# Patient Record
Sex: MALE | Race: WHITE | Employment: FULL TIME | URBAN - METROPOLITAN AREA
[De-identification: names, ages, dates, MRNs, and addresses within clinical notes are randomized per-mention and may not be internally consistent; named-entity substitution may affect disease eponyms.]

---

## 2018-12-28 ENCOUNTER — HOSPITAL ENCOUNTER (OUTPATIENT)
Dept: PREADMISSION TESTING | Age: 43
Discharge: HOME | End: 2018-12-28

## 2018-12-28 VITALS
WEIGHT: 244 LBS | BODY MASS INDEX: 34.93 KG/M2 | HEIGHT: 70 IN | TEMPERATURE: 97.9 F | HEART RATE: 93 BPM | SYSTOLIC BLOOD PRESSURE: 152 MMHG | RESPIRATION RATE: 16 BRPM | DIASTOLIC BLOOD PRESSURE: 93 MMHG

## 2018-12-28 RX ORDER — GLUCOSAMINE SULFATE 1500 MG
1000 POWDER IN PACKET (EA) ORAL DAILY
COMMUNITY

## 2018-12-28 RX ORDER — NAPROXEN 500 MG/1
500 TABLET ORAL AS NEEDED
COMMUNITY
End: 2019-01-09

## 2018-12-28 RX ORDER — ESOMEPRAZOLE MAGNESIUM 40 MG/1
40 CAPSULE, DELAYED RELEASE ORAL DAILY
COMMUNITY

## 2019-01-04 NOTE — H&P
Fausto Guzman Location: [place] [address] [address] Patient #: [id number] : 1975  / Language: Aubrie Jeterlakisha / Vivi Pelt: More Boateng Male History of Present Illness The patient is a 37year old male who presents to the practice today for a transition into care. Additional reasons for visit: 
 
Hip Pain is described as the following: The onset of the hip pain has been gradual and has been occurring in a persistent pattern for 8 months. The course has been gradually worsening. The hip pain is described as a moderate sharp stabbing. The hip pain is described as being located in the hip (left). The pain is aggravated by sitting and bending. Previous diagnostic tests have included MRI. Note for \"Hip Pain\": She was referred for arthroscopic treatment of his left hip labral tear. Patient's left hip pain is directly in his groin. Associated with bent hip activities like sitting. So far he's had extensive structured physical therapy ordered by his physician. He has tried anti-inflammatory medication. His workup has consisted of MRI and x-rays. His MRI shows a labral tear from cam impingement. He has failed nonoperative treatment. He's had symptoms for over one year. He has at this point elected to proceed with arthroscopic surgery and is here specifically today to schedule this. He does have extensive office notes from his referring physician that are available for review. Allergies No Known Allergies  [2018]: 
No Known Drug Allergies  [2018]: Allergies Reconciled   
 
Family History No pertinent family history   
 
Social History Alcohol use   1-2 drinks per occasion, 2 times, Drinks beer, Drinks hard liquor, Never drinks more than 5 drinks per occasion, per month. Caffeine use   1-2 drinks per day, Coffee. Current work status   Full-time. Marital status   . No drug use   
Seat Belt Use   Always uses seat belts. Tobacco / smoke exposure   None. Tobacco use   Never smoker. Medication History CeleBREX  (100MG Capsule, Oral) Active. NexIUM  (10MG Packet, Oral) Active. Medications Reconciled  
 
Past Surgical History No pertinent past surgical history   
 
Other Problems Gastroesophageal Reflux Disease   
 
 
 
Review of Systems General Present- Weight Gain. Not Present- Appetite Loss, Chills, Fatigue, Fever, HIV Exposure, Night Sweats, Persistent Infections, Seasonal Allergies and Weight Loss. Skin Not Present- Itching, Nail Changes, Poor Wound Healing, Rash, Skin Color Changes, Suspicious Lesions and Yellowish Skin Color. HEENT Present- Ringing in the Ears. Not Present- Decreased Hearing, Earache, Hoarseness, Loose Teeth, Nose Bleed and Sore Throat. Respiratory Present- Chronic Cough. Not Present- Bloody sputum, Difficulty Breathing, Snoring, Wakes up from Sleep Wheezing or Short of Breath and Wheezing. Cardiovascular Not Present- Bluish Discoloration Of Lips Or Nails, Chest Pain, Difficulty Breathing Lying Down, Difficulty Breathing On Exertion, Leg Cramps With Exertion, Palpitations and Swelling of Extremities. Gastrointestinal Not Present- Abdominal Pain, Black, Tarry Stool, Change in Bowel Habits, Cirrhosis, Constipation, Diarrhea, Difficulty Swallowing, Nausea and Vomiting. Male Genitourinary Not Present- Blood in Urine, Frequency, Painful Urination, Pelvic Pain, Trouble Starting Urinary Stream and Urgency. Musculoskeletal Present- Joint Pain and Joint Stiffness. Not Present- Back Pain and Joint Swelling. Neurological Present- Headaches, Numbness and Tingling. Not Present- Fainting, Memory Loss, Seizures, Tremor, Unsteadiness and Weakness. Psychiatric Not Present- Anxiety, Bipolar and Depression. Endocrine Not Present- Cold Intolerance, Excessive Hunger, Excessive Thirst, Excessive Urination and Heat Intolerance. Hematology Not Present- Abnormal Bruising , Enlarged Lymph Nodes, Excessive bleeding and Skin Discoloration. Vitals Weight: 235 lb   Height: 70 in  
Weight was reported by patient. Height was reported by patient. Body Surface Area: 2.24 m²   Body Mass Index: 33.72 kg/m²   
 
 
 
 
Physical Exam 
Musculoskeletal 
Global Assessment Examination of related systems reveals - well-developed, well-nourished, in no acute distress, alert and oriented x 3. Gait and Station - normal posture. Right Lower Extremity - Note: Patient's hip was examined. Impingement and apprehension signs are negative. Hip extends fully. Hip flexes to 90+ degrees. There is no trochanteric tenderness. Straight leg raise and femoral nerve stretch test are negative. Extremity is sensate and perfused with palpable dorsalis pedis pulse. Hip flexors, quads, ankle plantar flexors, ankle dorsiflexors have 5 out of 5 strength. Left Lower Extremity - Note: Patient's hip was examined. Labral provocative maneuvers are positive. Impingement test positive. Log roll test positive. No pain with resisted hip flexion. Hip extends fully. Hip flexes to 90+ degrees. There is no trochanteric tenderness. Straight leg raise and femoral nerve stretch test are negative. Extremity is sensate and perfused with palpable dorsalis pedis pulse. Hip flexors, quads, ankle plantar flexors, ankle dorsiflexors have 5 out of 5 strength. Assessment & Plan Tear of left acetabular labrum, initial encounter (223.8  L21.353R) Impression: Reviewed the patient's MRI. Obtain x-rays for preoperative planning. He has cam impingement and labral tear. He's had one year of symptoms. He's had physical therapy as well as medications. All of his office notes from his prior surgeon are available and will be included in his preauthorization. He is scheduled today for a left hip arthroscopy and labral repair. Risks benefits and recovery were discussed. He understands that he will have 1 month of partial weightbearing on crutches. Current Plans Pt Education - How to access health information online: discussed with patient and provided information. Pt Education - Educational materials were provided.: discussed with patient and provided information. X-RAY EXAM OF HIP COMPLETE min 2 VIEWS (67010) (left. Left hip 2 views. Well-maintained joint space. Cam impingement. Tonis 1) REVIEW OF SYSTEMS: Systems were reviewed by the provider.(V49.9) Weight above 97th percentile (V49.89  Z78.9) Current Plans LIFESTYLE EDUCATION REGARDING DIET (00388) Signed by Cat Guzmán MD

## 2019-01-09 ENCOUNTER — ANESTHESIA EVENT (OUTPATIENT)
Dept: SURGERY | Age: 44
End: 2019-01-09
Payer: COMMERCIAL

## 2019-01-09 ENCOUNTER — APPOINTMENT (OUTPATIENT)
Dept: GENERAL RADIOLOGY | Age: 44
End: 2019-01-09
Attending: ORTHOPAEDIC SURGERY
Payer: COMMERCIAL

## 2019-01-09 ENCOUNTER — ANESTHESIA (OUTPATIENT)
Dept: SURGERY | Age: 44
End: 2019-01-09
Payer: COMMERCIAL

## 2019-01-09 ENCOUNTER — HOSPITAL ENCOUNTER (OUTPATIENT)
Age: 44
Discharge: HOME | End: 2019-01-09
Attending: ORTHOPAEDIC SURGERY | Admitting: ORTHOPAEDIC SURGERY
Payer: COMMERCIAL

## 2019-01-09 VITALS
RESPIRATION RATE: 21 BRPM | SYSTOLIC BLOOD PRESSURE: 118 MMHG | DIASTOLIC BLOOD PRESSURE: 76 MMHG | BODY MASS INDEX: 34.93 KG/M2 | OXYGEN SATURATION: 95 % | TEMPERATURE: 97.2 F | WEIGHT: 244 LBS | HEIGHT: 70 IN | HEART RATE: 85 BPM

## 2019-01-09 DIAGNOSIS — S73.192A ACETABULAR LABRUM TEAR, LEFT, INITIAL ENCOUNTER: Primary | ICD-10-CM

## 2019-01-09 LAB
GLUCOSE BLD STRIP.AUTO-MCNC: 89 MG/DL (ref 65–100)
SERVICE CMNT-IMP: NORMAL

## 2019-01-09 PROCEDURE — 74011250637 HC RX REV CODE- 250/637: Performed by: ANESTHESIOLOGY

## 2019-01-09 PROCEDURE — 74011250636 HC RX REV CODE- 250/636

## 2019-01-09 PROCEDURE — 77030002916 HC SUT ETHLN J&J -A: Performed by: ORTHOPAEDIC SURGERY

## 2019-01-09 PROCEDURE — 77030029352 HC BLD FLL RAD SAMURAI STRY -C: Performed by: ORTHOPAEDIC SURGERY

## 2019-01-09 PROCEDURE — 77030008684 HC TU ET CUF COVD -B: Performed by: NURSE ANESTHETIST, CERTIFIED REGISTERED

## 2019-01-09 PROCEDURE — 77030016555 HC BLD SHV INCIS4 S&N -B: Performed by: ORTHOPAEDIC SURGERY

## 2019-01-09 PROCEDURE — 76060000036 HC ANESTHESIA 2.5 TO 3 HR: Performed by: ORTHOPAEDIC SURGERY

## 2019-01-09 PROCEDURE — 82962 GLUCOSE BLOOD TEST: CPT

## 2019-01-09 PROCEDURE — 76000 FLUOROSCOPY <1 HR PHYS/QHP: CPT

## 2019-01-09 PROCEDURE — 76010000132 HC OR TIME 2.5 TO 3 HR: Performed by: ORTHOPAEDIC SURGERY

## 2019-01-09 PROCEDURE — 77030021162 HC TU IRR ENDOSC BAXT -A: Performed by: ORTHOPAEDIC SURGERY

## 2019-01-09 PROCEDURE — 76210000022 HC REC RM PH II 1.5 TO 2 HR: Performed by: ORTHOPAEDIC SURGERY

## 2019-01-09 PROCEDURE — 74011250636 HC RX REV CODE- 250/636: Performed by: ANESTHESIOLOGY

## 2019-01-09 PROCEDURE — 74011000250 HC RX REV CODE- 250

## 2019-01-09 PROCEDURE — 77030029353 HC SUT PASS ENDOSC NANOPS STRY -C: Performed by: ORTHOPAEDIC SURGERY

## 2019-01-09 PROCEDURE — 77030020788: Performed by: ORTHOPAEDIC SURGERY

## 2019-01-09 PROCEDURE — 77030026438 HC STYL ET INTUB CARD -A: Performed by: NURSE ANESTHETIST, CERTIFIED REGISTERED

## 2019-01-09 PROCEDURE — C1713 ANCHOR/SCREW BN/BN,TIS/BN: HCPCS | Performed by: ORTHOPAEDIC SURGERY

## 2019-01-09 PROCEDURE — 77030013079 HC BLNKT BAIR HGGR 3M -A: Performed by: NURSE ANESTHETIST, CERTIFIED REGISTERED

## 2019-01-09 PROCEDURE — 77030028574 HC ACC KT DISP ARTH -C: Performed by: ORTHOPAEDIC SURGERY

## 2019-01-09 PROCEDURE — 74011250636 HC RX REV CODE- 250/636: Performed by: ORTHOPAEDIC SURGERY

## 2019-01-09 PROCEDURE — 77030003666 HC NDL SPINAL BD -A: Performed by: ORTHOPAEDIC SURGERY

## 2019-01-09 PROCEDURE — 77030018835 HC SOL IRR LR ICUM -A: Performed by: ORTHOPAEDIC SURGERY

## 2019-01-09 PROCEDURE — 77030008496 HC TBNG ARTHSC IRR S&N -B: Performed by: ORTHOPAEDIC SURGERY

## 2019-01-09 PROCEDURE — 77030008467 HC STPLR SKN COVD -B: Performed by: ORTHOPAEDIC SURGERY

## 2019-01-09 PROCEDURE — 77030038012 HC WND COBLATN S&N -F: Performed by: ORTHOPAEDIC SURGERY

## 2019-01-09 PROCEDURE — 77030016677 HC BUR SHV ABRD S&N -B: Performed by: ORTHOPAEDIC SURGERY

## 2019-01-09 PROCEDURE — 74011000250 HC RX REV CODE- 250: Performed by: ORTHOPAEDIC SURGERY

## 2019-01-09 PROCEDURE — 77030028224 HC PDNG CST BSNM -A: Performed by: ORTHOPAEDIC SURGERY

## 2019-01-09 PROCEDURE — 76210000016 HC OR PH I REC 1 TO 1.5 HR: Performed by: ORTHOPAEDIC SURGERY

## 2019-01-09 DEVICE — SUTR ANCH HIP BIO-COMP P-LCK 2.9X 15.5MM
Type: IMPLANTABLE DEVICE | Site: HIP | Status: FUNCTIONAL
Brand: ARTHREX®

## 2019-01-09 RX ORDER — DICLOFENAC SODIUM 75 MG/1
75 TABLET, DELAYED RELEASE ORAL 2 TIMES DAILY
Qty: 28 TAB | Refills: 0 | Status: SHIPPED | OUTPATIENT
Start: 2019-01-09

## 2019-01-09 RX ORDER — GLYCOPYRROLATE 0.2 MG/ML
INJECTION INTRAMUSCULAR; INTRAVENOUS AS NEEDED
Status: DISCONTINUED | OUTPATIENT
Start: 2019-01-09 | End: 2019-01-09 | Stop reason: HOSPADM

## 2019-01-09 RX ORDER — ONDANSETRON 2 MG/ML
4 INJECTION INTRAMUSCULAR; INTRAVENOUS ONCE
Status: COMPLETED | OUTPATIENT
Start: 2019-01-09 | End: 2019-01-09

## 2019-01-09 RX ORDER — SODIUM CHLORIDE 0.9 % (FLUSH) 0.9 %
5-40 SYRINGE (ML) INJECTION AS NEEDED
Status: DISCONTINUED | OUTPATIENT
Start: 2019-01-09 | End: 2019-01-09 | Stop reason: HOSPADM

## 2019-01-09 RX ORDER — FENTANYL CITRATE 50 UG/ML
INJECTION, SOLUTION INTRAMUSCULAR; INTRAVENOUS AS NEEDED
Status: DISCONTINUED | OUTPATIENT
Start: 2019-01-09 | End: 2019-01-09 | Stop reason: HOSPADM

## 2019-01-09 RX ORDER — ROPIVACAINE HYDROCHLORIDE 5 MG/ML
150 INJECTION, SOLUTION EPIDURAL; INFILTRATION; PERINEURAL AS NEEDED
Status: DISCONTINUED | OUTPATIENT
Start: 2019-01-09 | End: 2019-01-09 | Stop reason: HOSPADM

## 2019-01-09 RX ORDER — SODIUM CHLORIDE 0.9 % (FLUSH) 0.9 %
5-40 SYRINGE (ML) INJECTION EVERY 8 HOURS
Status: DISCONTINUED | OUTPATIENT
Start: 2019-01-09 | End: 2019-01-09 | Stop reason: HOSPADM

## 2019-01-09 RX ORDER — ONDANSETRON 2 MG/ML
INJECTION INTRAMUSCULAR; INTRAVENOUS AS NEEDED
Status: DISCONTINUED | OUTPATIENT
Start: 2019-01-09 | End: 2019-01-09 | Stop reason: HOSPADM

## 2019-01-09 RX ORDER — MIDAZOLAM HYDROCHLORIDE 1 MG/ML
0.5 INJECTION, SOLUTION INTRAMUSCULAR; INTRAVENOUS
Status: DISCONTINUED | OUTPATIENT
Start: 2019-01-09 | End: 2019-01-09 | Stop reason: HOSPADM

## 2019-01-09 RX ORDER — NEOSTIGMINE METHYLSULFATE 1 MG/ML
INJECTION INTRAVENOUS AS NEEDED
Status: DISCONTINUED | OUTPATIENT
Start: 2019-01-09 | End: 2019-01-09 | Stop reason: HOSPADM

## 2019-01-09 RX ORDER — LIDOCAINE HYDROCHLORIDE 10 MG/ML
0.1 INJECTION, SOLUTION EPIDURAL; INFILTRATION; INTRACAUDAL; PERINEURAL AS NEEDED
Status: DISCONTINUED | OUTPATIENT
Start: 2019-01-09 | End: 2019-01-09 | Stop reason: HOSPADM

## 2019-01-09 RX ORDER — MIDAZOLAM HYDROCHLORIDE 1 MG/ML
INJECTION, SOLUTION INTRAMUSCULAR; INTRAVENOUS AS NEEDED
Status: DISCONTINUED | OUTPATIENT
Start: 2019-01-09 | End: 2019-01-09 | Stop reason: HOSPADM

## 2019-01-09 RX ORDER — HYDROMORPHONE HYDROCHLORIDE 2 MG/ML
INJECTION, SOLUTION INTRAMUSCULAR; INTRAVENOUS; SUBCUTANEOUS AS NEEDED
Status: DISCONTINUED | OUTPATIENT
Start: 2019-01-09 | End: 2019-01-09 | Stop reason: HOSPADM

## 2019-01-09 RX ORDER — MIDAZOLAM HYDROCHLORIDE 1 MG/ML
1 INJECTION, SOLUTION INTRAMUSCULAR; INTRAVENOUS AS NEEDED
Status: DISCONTINUED | OUTPATIENT
Start: 2019-01-09 | End: 2019-01-09 | Stop reason: HOSPADM

## 2019-01-09 RX ORDER — KETOROLAC TROMETHAMINE 30 MG/ML
INJECTION, SOLUTION INTRAMUSCULAR; INTRAVENOUS AS NEEDED
Status: DISCONTINUED | OUTPATIENT
Start: 2019-01-09 | End: 2019-01-09 | Stop reason: HOSPADM

## 2019-01-09 RX ORDER — CEFAZOLIN SODIUM/WATER 2 G/20 ML
2 SYRINGE (ML) INTRAVENOUS ONCE
Status: COMPLETED | OUTPATIENT
Start: 2019-01-09 | End: 2019-01-09

## 2019-01-09 RX ORDER — SODIUM CHLORIDE 0.9 % (FLUSH) 0.9 %
5-10 SYRINGE (ML) INJECTION EVERY 8 HOURS
Status: DISCONTINUED | OUTPATIENT
Start: 2019-01-09 | End: 2019-01-09 | Stop reason: HOSPADM

## 2019-01-09 RX ORDER — OXYCODONE HYDROCHLORIDE 5 MG/1
5 TABLET ORAL ONCE
Status: COMPLETED | OUTPATIENT
Start: 2019-01-09 | End: 2019-01-09

## 2019-01-09 RX ORDER — ROCURONIUM BROMIDE 10 MG/ML
INJECTION, SOLUTION INTRAVENOUS AS NEEDED
Status: DISCONTINUED | OUTPATIENT
Start: 2019-01-09 | End: 2019-01-09 | Stop reason: HOSPADM

## 2019-01-09 RX ORDER — FENTANYL CITRATE 50 UG/ML
25 INJECTION, SOLUTION INTRAMUSCULAR; INTRAVENOUS
Status: DISCONTINUED | OUTPATIENT
Start: 2019-01-09 | End: 2019-01-09 | Stop reason: HOSPADM

## 2019-01-09 RX ORDER — SODIUM CHLORIDE, SODIUM LACTATE, POTASSIUM CHLORIDE, CALCIUM CHLORIDE 600; 310; 30; 20 MG/100ML; MG/100ML; MG/100ML; MG/100ML
100 INJECTION, SOLUTION INTRAVENOUS CONTINUOUS
Status: DISCONTINUED | OUTPATIENT
Start: 2019-01-09 | End: 2019-01-09 | Stop reason: HOSPADM

## 2019-01-09 RX ORDER — FENTANYL CITRATE 50 UG/ML
50 INJECTION, SOLUTION INTRAMUSCULAR; INTRAVENOUS AS NEEDED
Status: DISCONTINUED | OUTPATIENT
Start: 2019-01-09 | End: 2019-01-09 | Stop reason: HOSPADM

## 2019-01-09 RX ORDER — DEXAMETHASONE SODIUM PHOSPHATE 4 MG/ML
INJECTION, SOLUTION INTRA-ARTICULAR; INTRALESIONAL; INTRAMUSCULAR; INTRAVENOUS; SOFT TISSUE AS NEEDED
Status: DISCONTINUED | OUTPATIENT
Start: 2019-01-09 | End: 2019-01-09 | Stop reason: HOSPADM

## 2019-01-09 RX ORDER — ONDANSETRON 2 MG/ML
INJECTION INTRAMUSCULAR; INTRAVENOUS
Status: COMPLETED
Start: 2019-01-09 | End: 2019-01-09

## 2019-01-09 RX ORDER — OXYCODONE HYDROCHLORIDE 5 MG/1
5 TABLET ORAL
Qty: 20 TAB | Refills: 0 | Status: SHIPPED | OUTPATIENT
Start: 2019-01-09

## 2019-01-09 RX ORDER — SUCCINYLCHOLINE CHLORIDE 20 MG/ML
INJECTION INTRAMUSCULAR; INTRAVENOUS AS NEEDED
Status: DISCONTINUED | OUTPATIENT
Start: 2019-01-09 | End: 2019-01-09 | Stop reason: HOSPADM

## 2019-01-09 RX ORDER — BUPIVACAINE HYDROCHLORIDE AND EPINEPHRINE 5; 5 MG/ML; UG/ML
INJECTION, SOLUTION EPIDURAL; INTRACAUDAL; PERINEURAL AS NEEDED
Status: DISCONTINUED | OUTPATIENT
Start: 2019-01-09 | End: 2019-01-09 | Stop reason: HOSPADM

## 2019-01-09 RX ORDER — PHENYLEPHRINE HCL IN 0.9% NACL 0.4MG/10ML
SYRINGE (ML) INTRAVENOUS AS NEEDED
Status: DISCONTINUED | OUTPATIENT
Start: 2019-01-09 | End: 2019-01-09 | Stop reason: HOSPADM

## 2019-01-09 RX ORDER — SODIUM CHLORIDE 0.9 % (FLUSH) 0.9 %
5-10 SYRINGE (ML) INJECTION AS NEEDED
Status: DISCONTINUED | OUTPATIENT
Start: 2019-01-09 | End: 2019-01-09 | Stop reason: HOSPADM

## 2019-01-09 RX ORDER — DEXMEDETOMIDINE HYDROCHLORIDE 4 UG/ML
INJECTION, SOLUTION INTRAVENOUS AS NEEDED
Status: DISCONTINUED | OUTPATIENT
Start: 2019-01-09 | End: 2019-01-09 | Stop reason: HOSPADM

## 2019-01-09 RX ORDER — PROPOFOL 10 MG/ML
INJECTION, EMULSION INTRAVENOUS AS NEEDED
Status: DISCONTINUED | OUTPATIENT
Start: 2019-01-09 | End: 2019-01-09 | Stop reason: HOSPADM

## 2019-01-09 RX ORDER — LIDOCAINE HYDROCHLORIDE 20 MG/ML
INJECTION, SOLUTION EPIDURAL; INFILTRATION; INTRACAUDAL; PERINEURAL AS NEEDED
Status: DISCONTINUED | OUTPATIENT
Start: 2019-01-09 | End: 2019-01-09 | Stop reason: HOSPADM

## 2019-01-09 RX ADMIN — DEXMEDETOMIDINE HYDROCHLORIDE 8 MCG: 4 INJECTION, SOLUTION INTRAVENOUS at 15:08

## 2019-01-09 RX ADMIN — FENTANYL CITRATE 25 MCG: 50 INJECTION INTRAMUSCULAR; INTRAVENOUS at 16:00

## 2019-01-09 RX ADMIN — ROCURONIUM BROMIDE 40 MG: 10 INJECTION, SOLUTION INTRAVENOUS at 12:59

## 2019-01-09 RX ADMIN — FENTANYL CITRATE 100 MCG: 50 INJECTION, SOLUTION INTRAMUSCULAR; INTRAVENOUS at 12:50

## 2019-01-09 RX ADMIN — GLYCOPYRROLATE 0.6 MG: 0.2 INJECTION INTRAMUSCULAR; INTRAVENOUS at 15:18

## 2019-01-09 RX ADMIN — ROCURONIUM BROMIDE 5 MG: 10 INJECTION, SOLUTION INTRAVENOUS at 13:39

## 2019-01-09 RX ADMIN — MIDAZOLAM HYDROCHLORIDE 2 MG: 1 INJECTION, SOLUTION INTRAMUSCULAR; INTRAVENOUS at 12:42

## 2019-01-09 RX ADMIN — SODIUM CHLORIDE, SODIUM LACTATE, POTASSIUM CHLORIDE, AND CALCIUM CHLORIDE: 600; 310; 30; 20 INJECTION, SOLUTION INTRAVENOUS at 14:59

## 2019-01-09 RX ADMIN — NEOSTIGMINE METHYLSULFATE 3 MG: 1 INJECTION INTRAVENOUS at 15:18

## 2019-01-09 RX ADMIN — DEXAMETHASONE SODIUM PHOSPHATE 8 MG: 4 INJECTION, SOLUTION INTRA-ARTICULAR; INTRALESIONAL; INTRAMUSCULAR; INTRAVENOUS; SOFT TISSUE at 12:59

## 2019-01-09 RX ADMIN — ONDANSETRON 4 MG: 2 INJECTION INTRAMUSCULAR; INTRAVENOUS at 17:24

## 2019-01-09 RX ADMIN — Medication 2 G: at 13:01

## 2019-01-09 RX ADMIN — HYDROMORPHONE HYDROCHLORIDE 1 MG: 2 INJECTION, SOLUTION INTRAMUSCULAR; INTRAVENOUS; SUBCUTANEOUS at 13:31

## 2019-01-09 RX ADMIN — SUCCINYLCHOLINE CHLORIDE 160 MG: 20 INJECTION INTRAMUSCULAR; INTRAVENOUS at 12:50

## 2019-01-09 RX ADMIN — LIDOCAINE HYDROCHLORIDE 100 MG: 20 INJECTION, SOLUTION EPIDURAL; INFILTRATION; INTRACAUDAL; PERINEURAL at 12:50

## 2019-01-09 RX ADMIN — OXYCODONE HYDROCHLORIDE 5 MG: 5 TABLET ORAL at 16:23

## 2019-01-09 RX ADMIN — SODIUM CHLORIDE, SODIUM LACTATE, POTASSIUM CHLORIDE, AND CALCIUM CHLORIDE 100 ML/HR: 600; 310; 30; 20 INJECTION, SOLUTION INTRAVENOUS at 11:52

## 2019-01-09 RX ADMIN — HYDROMORPHONE HYDROCHLORIDE 0.5 MG: 2 INJECTION, SOLUTION INTRAMUSCULAR; INTRAVENOUS; SUBCUTANEOUS at 14:57

## 2019-01-09 RX ADMIN — Medication 80 MCG: at 13:45

## 2019-01-09 RX ADMIN — HYDROMORPHONE HYDROCHLORIDE 0.5 MG: 2 INJECTION, SOLUTION INTRAMUSCULAR; INTRAVENOUS; SUBCUTANEOUS at 14:36

## 2019-01-09 RX ADMIN — DEXMEDETOMIDINE HYDROCHLORIDE 8 MCG: 4 INJECTION, SOLUTION INTRAVENOUS at 15:20

## 2019-01-09 RX ADMIN — PROPOFOL 100 MG: 10 INJECTION, EMULSION INTRAVENOUS at 12:55

## 2019-01-09 RX ADMIN — PROPOFOL 200 MG: 10 INJECTION, EMULSION INTRAVENOUS at 12:50

## 2019-01-09 RX ADMIN — ROCURONIUM BROMIDE 10 MG: 10 INJECTION, SOLUTION INTRAVENOUS at 12:50

## 2019-01-09 RX ADMIN — ONDANSETRON 4 MG: 2 INJECTION INTRAMUSCULAR; INTRAVENOUS at 15:14

## 2019-01-09 RX ADMIN — KETOROLAC TROMETHAMINE 30 MG: 30 INJECTION, SOLUTION INTRAMUSCULAR; INTRAVENOUS at 15:14

## 2019-01-09 RX ADMIN — ROCURONIUM BROMIDE 10 MG: 10 INJECTION, SOLUTION INTRAVENOUS at 13:21

## 2019-01-09 RX ADMIN — FENTANYL CITRATE 25 MCG: 50 INJECTION INTRAMUSCULAR; INTRAVENOUS at 16:10

## 2019-01-09 NOTE — ANESTHESIA PREPROCEDURE EVALUATION
Anesthetic History No history of anesthetic complications Review of Systems / Medical History Patient summary reviewed, nursing notes reviewed and pertinent labs reviewed Pulmonary Within defined limits Neuro/Psych Within defined limits Cardiovascular Within defined limits Exercise tolerance: >4 METS 
  
GI/Hepatic/Renal 
  
GERD: well controlled Endo/Other Obesity Other Findings Physical Exam 
 
Airway Mallampati: II 
TM Distance: > 6 cm Neck ROM: normal range of motion Mouth opening: Normal 
 
 Cardiovascular Regular rate and rhythm,  S1 and S2 normal,  no murmur, click, rub, or gallop Dental 
No notable dental hx Pulmonary Breath sounds clear to auscultation Abdominal 
GI exam deferred Other Findings Anesthetic Plan ASA: 2 Anesthesia type: general 
 
 
 
 
Induction: Intravenous Anesthetic plan and risks discussed with: Patient

## 2019-01-09 NOTE — BRIEF OP NOTE
BRIEF OPERATIVE NOTE Date of Procedure: 1/9/2019 Preoperative Diagnosis: TEAR OF LEFT ACETABULAR LABRUM Postoperative Diagnosis: TEAR OF LEFT ACETABULAR LABRUM Procedure(s): LEFT HIP ARTHROSCOPY, LABRAL REPAIR, femoral osteoplasty. 3 Anchors Surgeon(s) and Role: Baldev Warren MD - Primary Surgical Assistant: None Surgical Staff: 
Circ-1: Mal Newsome RN 
Circ-2: Gerda Boeck, RN 
Circ-Relief: Casey Lewis RN Scrub Tech-1: Andre Shanks Scrub Tech-Relief: Tatum Boyd Event Time In Time Out Incision Start [phone] Incision Close 1516 Anesthesia: General  
Estimated Blood Loss: minimal 
Specimens: * No specimens in log * Findings: Labral tear. Cam impingement Complications: none Implants:  
Implant Name Type Inv. Item Serial No.  Lot No. LRB No. Used Action 2.9X15.5MM HIP CIOCOMPOSITY PUSHLOCK    [id number]  [id number] Left 3 Implanted

## 2019-01-09 NOTE — DISCHARGE INSTRUCTIONS
** Use ice on your hip. The more ice, the better, especially in the first several days after your surgery. ** Do not bend your hip past 90 degrees. ** Do not place any weight on your right leg for 6 weeks after surgery. ** Use your crutches  ** Follow up with Dr. Nestor Larsen in 2 weeks. Please call [phone] for an appointment. ** Please finish your entire Voltaren (anti-inflammatory) prescription. This medication helps to prevent the abnormal growth of bone within your hip joint. [facility]  [phone]    Hip Arthroscopy: What to Expect at Home  Your Recovery  Arthroscopy is a way to find problems and do surgery inside a joint without making a large cut (incision). Your doctor put a lighted tube with a tiny camera called an arthroscope, or scope and surgical tools through small incisions in your hip. You will feel tired for several days. Your hip will be swollen, and you may notice that your skin is a different color near the cuts (incisions). The swelling is normal and will start to go away in a few days. Keeping your leg higher than your heart will help with swelling and pain. Occasionally, some people notice a color change in the toes of their operative leg. You will probably need about 6 weeks to recover. If your doctor repaired damaged tissue, however, recovery will take longer. For example, if your labrum is repaired, you will be on crutches for 6 weeks. You may have to limit your activity until your hip strength and movement return to normal. You may also be in a physical rehabilitation (rehab) program.  You may be able to return to a desk job or your normal routine in a few days. But if you do physical labor, it may be as long as 2 months before you can return to work. This care sheet gives you a general idea about how long it will take for you to recover. But each person recovers at a different pace. Follow the steps below to get better as quickly as possible.   How can you care for yourself at home? Activity  · Rest when you feel tired. Getting enough sleep will help you recover. Use pillows to raise your ankle and leg above the level of your heart. · Try to walk each day, after your doctor has said you can. Start by walking a little more than you did the day before. Bit by bit, increase the amount you walk. Walking boosts blood flow and helps prevent pneumonia and constipation. If you are sent home with crutches, please use them to walk. · You may be sent home with crutches and be instructed to avoid placing any weight on your operative leg. Use your crutches until you are instructed that you don't need them any more (usually 6 weeks). · If you have a desk job, you may be able to return to work a few days after the surgery. If you lift things or stand or walk a lot at work, it may be as long as 2 months before you can return. · You can take a shower 48 to 72 hours after surgery and clean the incisions with regular soap and water. Do not take a bath or soak your hip until your doctor says it is okay. · Ask your doctor when you can drive again. Diet  · You can eat your normal diet. If your stomach is upset, try bland, low-fat foods like plain rice, broiled chicken, toast, and yogurt. · You may notice that your bowel movements are not regular right after your surgery. This is common. Try to avoid constipation and straining with bowel movements. You may want to take a fiber supplement every day. If you have not had a bowel movement after a couple of days, ask your doctor about taking a mild laxative. Just remember to drink plenty of fluids, especially if you are taking fiber or a laxative. Medicines  · Take pain medicines exactly as directed. ¨ If the doctor gave you a prescription medicine for pain, take it as you need it but not more often than prescribed.   ¨ If you are not taking a prescription pain medicine, ask your doctor if you can take an over-the-counter medicine. ¨ Do not take two or more pain medicines at the same time unless the doctor told you to. Many pain medicines have acetaminophen, which is Tylenol. Too much acetaminophen (Tylenol) can be harmful. · If you think your pain medicine is making you sick to your stomach:  ¨ Take your medicine after meals (unless your doctor has told you not to). ¨ Ask your doctor for a different pain medicine. · If your doctor prescribed antibiotics, take them as directed. Do not stop taking them just because you feel better. You need to finish the full course of antibiotics. Incision care  · If you have a dressing over your cuts (incisions) you may remove it 48 to 72 hours after the surgery. If there is no drainage, you can leave your incisions uncovered. If there is drainage, please cover the incisions with gauze and tape and change the dressing daily. · If your incisions are open to the air, keep the area clean and dry. · You will most likely have staples or external suture. If these get caught on your clothes, you can cover them lightly with a bandaid or light dressing. They will be removed at your first office visit. If you have strips of tape on the incisions, the strips will usually fall off on their own. .  Exercise  · Move your toes and ankle as much as your bandages will allow. .  · Do rehab exercises as directed by your doctor or physical therapist.  This may not be until 6 weeks after your surgery. Physical therapy is a very important part of your healing. · Stop any activity that causes sharp pain. Talk to your doctor or physical therapist about what sports or other exercise you can do. Ice and elevation  · To reduce swelling and pain, put ice or a cold pack on your hip for 20 to 30 minutes at a time. Do this every 1 to 2 hours. Put a thin cloth between the ice and your skin. The more ice the better for the first several days. Follow-up care is a key part of your treatment and safety.  Be sure to make and go to all appointments, and call your doctor if you are having problems. It's also a good idea to know your test results and keep a list of the medicines you take. When should you call for help? Call 911 anytime you think you may need emergency care. For example, call if:  · You pass out (lose consciousness). · You have severe trouble breathing. · You have sudden chest pain and shortness of breath, or you cough up blood. Call your doctor now or seek immediate medical care if:  · Your foot or toes are numb or tingling. · Your foot is cool or pale, or it changes color. · You are sick to your stomach or cannot keep fluids down. · You have pain that does not get better after you take pain medicine. · You have loose stitches, or your incision comes open. · Bright red blood has soaked through the bandage over your incision. · You have signs of infection, such as:  ¨ Increased pain, swelling, warmth, or redness. ¨ Red streaks leading from the incisions. ¨ Pus draining from the incisions. ¨ Swollen lymph nodes in your neck, armpits, or groin. ¨ A fever. Watch closely for any changes in your health, and be sure to contact your doctor if:  · You do not have a bowel movement after taking a laxative. DISCHARGE SUMMARY from Nurse    PATIENT INSTRUCTIONS:    After general anesthesia or intravenous sedation, for 24 hours or while taking prescription Narcotics:  · Limit your activities  · Do not drive and operate hazardous machinery  · Do not make important personal or business decisions  · Do  not drink alcoholic beverages  · If you have not urinated within 8 hours after discharge, please contact your surgeon on call.     Report the following to your surgeon:  · Excessive pain, swelling, redness or odor of or around the surgical area  · Temperature over 100.5  · Nausea and vomiting lasting longer than 4 hours or if unable to take medications  · Any signs of decreased circulation or nerve impairment to extremity: change in color, persistent  numbness, tingling, coldness or increase pain  · Any questions    What to do at Home:  Recommended activity: See surgical instructions,     These are general instructions for a healthy lifestyle:    No smoking/ No tobacco products/ Avoid exposure to second hand smoke  Surgeon General's Warning:  Quitting smoking now greatly reduces serious risk to your health. Obesity, smoking, and sedentary lifestyle greatly increases your risk for illness    A healthy diet, regular physical exercise & weight monitoring are important for maintaining a healthy lifestyle    You may be retaining fluid if you have a history of heart failure or if you experience any of the following symptoms:  Weight gain of 3 pounds or more overnight or 5 pounds in a week, increased swelling in our hands or feet or shortness of breath while lying flat in bed. Please call your doctor as soon as you notice any of these symptoms; do not wait until your next office visit. Recognize signs and symptoms of STROKE:    F-face looks uneven    A-arms unable to move or move unevenly    S-speech slurred or non-existent    T-time-call 911 as soon as signs and symptoms begin-DO NOT go       Back to bed or wait to see if you get better-TIME IS BRAIN. Warning Signs of HEART ATTACK     Call 911 if you have these symptoms:   Chest discomfort. Most heart attacks involve discomfort in the center of the chest that lasts more than a few minutes, or that goes away and comes back. It can feel like uncomfortable pressure, squeezing, fullness, or pain.  Discomfort in other areas of the upper body. Symptoms can include pain or discomfort in one or both arms, the back, neck, jaw, or stomach.  Shortness of breath with or without chest discomfort.  Other signs may include breaking out in a cold sweat, nausea, or lightheadedness. Don't wait more than five minutes to call 911 - MINUTES MATTER! Fast action can save your life. Calling 911 is almost always the fastest way to get lifesaving treatment. Emergency Medical Services staff can begin treatment when they arrive -- up to an hour sooner than if someone gets to the hospital by car. The discharge information has been reviewed with the patient and spouse. The patient and spouse verbalized understanding. Discharge medications reviewed with the patient and spouse and appropriate educational materials and side effects teaching were provided.   ___________________________________________________________________________________________________________________________________

## 2019-01-09 NOTE — H&P
Date of Surgery Update: 
Jessica Richards was seen and examined. History and physical has been reviewed. The patient has been examined.  There have been no significant clinical changes since the completion of the originally dated History and Physical. 
 
Signed By: Gloria Bass MD   
 January 9, 2019 12:25 PM

## 2019-01-09 NOTE — ANESTHESIA POSTPROCEDURE EVALUATION
Procedure(s): LEFT HIP ARTHROSCOPY, LABRAL REPAIR. Anesthesia Post Evaluation Comments: Post-Anesthesia Evaluation and Assessment I have evaluated the patient and they are ready for PACU discharge. Patient: Janneth Pleitez MRN: [id number]  SSN: [id number] YOB: 1975  Age: 37 y.o. Sex: male Cardiovascular Function/Vital Signs /60   Pulse 86   Temp 36.7 °C (98.1 °F)   Resp 13   Ht 5' 10\" (1.778 m)   Wt 110.7 kg (244 lb)   SpO2 95%   BMI 35.01 kg/m² Patient is status post General anesthesia for Procedure(s): LEFT HIP ARTHROSCOPY, LABRAL REPAIR. Nausea/Vomiting: None Postoperative hydration reviewed and adequate. Pain: 
Pain Scale 1: Numeric (0 - 10) (01/09/19 1600) Pain Intensity 1: 5 (01/09/19 1600) Managed Neurological Status:  
Neuro (WDL): Within Defined Limits (01/09/19 1159) At baseline Mental Status, Level of Consciousness: Alert and  oriented to person, place, and time Pulmonary Status:  
O2 Device: Nasal cannula (01/09/19 1537) Adequate oxygenation and airway patent Complications related to anesthesia: None Post-anesthesia assessment completed. No concerns Signed By: Leona Ferrell MD  
 January 9, 2019 Visit Vitals /60 Pulse 86 Temp 36.7 °C (98.1 °F) Resp 13 Ht 5' 10\" (1.778 m) Wt 110.7 kg (244 lb) SpO2 95% BMI 35.01 kg/m²

## 2019-01-12 NOTE — OP NOTES
[address] [place] [address] REPORT    Name:DEMARIO MANCUSO  MR#: [id number]  : 1975  ACCOUNT #: [de-identified]   DATE OF SERVICE: 2019    PREOPERATIVE DIAGNOSIS:  Left hip labral tear with hip impingement. POSTOPERATIVE DIAGNOSIS:  Left hip labral tear with hip impingement. PROCEDURES PERFORMED:  Arthroscopy, left hip, labral repair and femoral neck osteoplasty. SURGEON:  Ashley Barnes MD    ASSISTANT:  None. ANESTHESIA:  General.    ESTIMATED BLOOD LOSS:  Minimal.    COMPLICATIONS:  None. SPECIMENS REMOVED:  None. PREOPERATIVE ANTIBIOTIC:  Ancef. IMPLANTS:  Arthrex Bio-PushLock suture anchors x3. INDICATIONS:  This is a 63-year-old who has persistent complaints of left hip pain. His preoperative evaluation consisted of MRI which showed a torn labrum and hip impingement. Options were discussed and he has elected to proceed with arthroscopy and labral repair. PROCEDURE:  Anesthetic was initiated. A preoperative dose of antibiotic was given. The left side was confirmed as the operative side, prepped and draped in the usual sterile fashion. His hip was distracted under fluoroscopic guidance. A spinal needle was placed into the hip joint to release the vacuum. At this point, the traction was set to the least amount necessary to distract the hip. The hip was approached via anterior and posterior peritrochanteric portals. The posterior portal was established fluoroscopically. Anterior portal was established under direct visualization. The hip was examined. He had a torn labrum from anterior at the level of the anterior inferior iliac spine to directly posterior and superior. The portals were expanded with a [place] blade to gain mobility and at this point an electrocautery was used to dissect down to the acetabular rim.   A moderate sized acetabulum was identified and a [place] blade was used to dissect it away from the soft tissues and it was removed with a grasper. At this point, after delineation of the labral tear a bur was used to obtain a bleeding bed of bone along the acetabular rim in the area of the labral tear. I did perform a very minimal acetabuloplasty. After a good bleeding bed of bone was obtained, I reattached the labrum with simple sutures using Arthrex Bio-PushLock suture anchors. Three suture anchors were used. Pre and post-repair pictures were obtained and the repair was adequate. The traction was slowly reduced and the perineal post-pressure was reduced, allowing for exposure of the extraarticular space. After adequate exposure, a clearcut Cam bump was identified in the patient's femoral head and neck junction. This was delineated and a bur was used to perform a Cam decompression. A direct anterior portal was made to continue the Cam decompression to anterior inferior at his femoral neck. The portals were switched back and forth for viewing and working. The Cam debridement was continued posterior superior. Arthroscopic images were obtained. The hip joint was then copiously irrigated and then drained. Cannulas were removed. Portals were closed with staples and injected with local anesthetic. There were no complications. No specimen was sent. PROCEDURE:  Arthroscopy left hip, repair acetabular labrum and femoral head and neck junction osteoplasty. An osseous Acetabulum was removed. The patient tolerated the procedure well and was awakened from anesthetic. He was taken to the recovery room stable.       MD TL Abreu / ABHIJEET  D: 01/11/2019 19:17     T: 01/12/2019 05:12  JOB #: [id number]

## (undated) DEVICE — TUBING PMP IRRIG GOFLO

## (undated) DEVICE — SUT ETHLN 3-0 18IN PS1 BLK --

## (undated) DEVICE — 4.5 MM INCISOR PLUS ELITE LONG                                    SHAVER BLADES AND BURRS, POWER/EP-1,                                    SLATE, 18 CM WORKING LENGTH,                                    PACKAGED 3 PER BOX, STERILE

## (undated) DEVICE — TUR SERIES SET

## (undated) DEVICE — 3.5 MM STARVAC 90 INTEGRATED CABLE WAND ICW, 90 DEGREE: Brand: COBLATION

## (undated) DEVICE — 3M™ IOBAN™ 2 ANTIMICROBIAL INCISE DRAPE 6651EZ: Brand: IOBAN™ 2

## (undated) DEVICE — INTENDED FOR TISSUE SEPARATION, AND OTHER PROCEDURES THAT REQUIRE A SHARP SURGICAL BLADE TO PUNCTURE OR CUT.: Brand: BARD-PARKER ® CARBON RIB-BACK BLADES

## (undated) DEVICE — SKIN MARKER,REGULAR TIP WITH RULER AND LABELS: Brand: DEVON

## (undated) DEVICE — TOWEL SURG W17XL27IN STD BLU COT NONFENESTRATED PREWASHED

## (undated) DEVICE — DBD-PACK,LAPAROTOMY,2 REINFORCED GOWNS: Brand: MEDLINE

## (undated) DEVICE — 4-PORT MANIFOLD: Brand: NEPTUNE 2

## (undated) DEVICE — NDL SPNE QNCKE 18GX3.5IN LF --

## (undated) DEVICE — PADDING CST 4INX4YD --

## (undated) DEVICE — DRAPE XR C ARM 41X74IN LF --

## (undated) DEVICE — NANOPASS REACH CRESCENT: Brand: NANOPASS

## (undated) DEVICE — SLIM BODY SKIN STAPLER: Brand: APPOSE ULC

## (undated) DEVICE — STERILE POLYISOPRENE POWDER-FREE SURGICAL GLOVES WITH EMOLLIENT COATING: Brand: PROTEXIS

## (undated) DEVICE — MEDI-VAC NON-CONDUCTIVE SUCTION TUBING: Brand: CARDINAL HEALTH

## (undated) DEVICE — SPONGE LAPAROTOMY W4XL18IN WHT STRUNG RADPQ PREWASHED ST

## (undated) DEVICE — 5.5 MM LONG ABRADER SHAVER BLADES                                    AND BURRS, POWER/EP-1, 18 CM WORKING                                    LENGTH,BLACK,PACKAGED 3 PER BOX, STERILE

## (undated) DEVICE — PREP SKN PREVAIL 40ML APPL --

## (undated) DEVICE — 3M™ STERI-DRAPE™ U-DRAPE 1015: Brand: STERI-DRAPE™

## (undated) DEVICE — STERILE POLYISOPRENE POWDER-FREE SURGICAL GLOVES: Brand: PROTEXIS

## (undated) DEVICE — ASPIRATOR FLR L72IN SUCT TB W/ 1 DETACH FISH STK PUSH HNDL

## (undated) DEVICE — DRAPE,U/ SHT,SPLIT,PLAS,STERIL: Brand: MEDLINE

## (undated) DEVICE — KIT INSTR HIP DISP FOR 2.9MM SYS PUSHLOCK

## (undated) DEVICE — ABDOMINAL PAD: Brand: DERMACEA

## (undated) DEVICE — SYR LR LCK 1ML GRAD NSAF 30ML --

## (undated) DEVICE — INFECTION CONTROL KIT SYS

## (undated) DEVICE — PADDING CAST SPEC 6INX4YD COT --

## (undated) DEVICE — DEVON™ KNEE AND BODY STRAP 60" X 3" (1.5 M X 7.6 CM): Brand: DEVON

## (undated) DEVICE — SOLUTION IRRIG 3000ML LAC R FLX CONT

## (undated) DEVICE — GAUZE SPONGES,12 PLY: Brand: CURITY

## (undated) DEVICE — SAMURAI BLADE FULL RADIUS: Brand: SAMURAI